# Patient Record
Sex: MALE | URBAN - METROPOLITAN AREA
[De-identification: names, ages, dates, MRNs, and addresses within clinical notes are randomized per-mention and may not be internally consistent; named-entity substitution may affect disease eponyms.]

---

## 2024-10-08 ENCOUNTER — CLAIMS CREATED FROM THE CLAIM WINDOW (OUTPATIENT)
Dept: URBAN - METROPOLITAN AREA MEDICAL CENTER 2 | Facility: MEDICAL CENTER | Age: 54
End: 2024-10-08
Payer: MEDICARE

## 2024-10-08 DIAGNOSIS — D62 ACUTE POSTHEMORRHAGIC ANEMIA: ICD-10-CM

## 2024-10-08 DIAGNOSIS — K92.1 MELENA: ICD-10-CM

## 2024-10-08 DIAGNOSIS — D69.6 ACQUIRED AMEGAKARYOCYTIC THROMBOCYTOPENIA: ICD-10-CM

## 2024-10-08 PROCEDURE — 99223 1ST HOSP IP/OBS HIGH 75: CPT | Performed by: PHYSICIAN ASSISTANT

## 2024-10-08 PROCEDURE — 99255 IP/OBS CONSLTJ NEW/EST HI 80: CPT | Performed by: PHYSICIAN ASSISTANT

## 2024-10-09 ENCOUNTER — CLAIMS CREATED FROM THE CLAIM WINDOW (OUTPATIENT)
Dept: URBAN - METROPOLITAN AREA MEDICAL CENTER 2 | Facility: MEDICAL CENTER | Age: 54
End: 2024-10-09
Payer: MEDICARE

## 2024-10-09 DIAGNOSIS — D62 ACUTE POSTHEMORRHAGIC ANEMIA: ICD-10-CM

## 2024-10-09 DIAGNOSIS — K92.1 MELENA: ICD-10-CM

## 2024-10-09 DIAGNOSIS — I86.4 GASTRIC VARICES: ICD-10-CM

## 2024-10-09 PROCEDURE — 43255 EGD CONTROL BLEEDING ANY: CPT | Performed by: STUDENT IN AN ORGANIZED HEALTH CARE EDUCATION/TRAINING PROGRAM

## 2024-10-10 ENCOUNTER — CLAIMS CREATED FROM THE CLAIM WINDOW (OUTPATIENT)
Dept: URBAN - METROPOLITAN AREA MEDICAL CENTER 2 | Facility: MEDICAL CENTER | Age: 54
End: 2024-10-10
Payer: MEDICARE

## 2024-10-10 ENCOUNTER — TELEPHONE ENCOUNTER (OUTPATIENT)
Dept: URBAN - METROPOLITAN AREA CLINIC 113 | Facility: CLINIC | Age: 54
End: 2024-10-10

## 2024-10-10 DIAGNOSIS — I86.4 GASTRIC VARICES: ICD-10-CM

## 2024-10-10 DIAGNOSIS — D62 ACUTE POSTHEMORRHAGIC ANEMIA: ICD-10-CM

## 2024-10-10 DIAGNOSIS — K92.1 ACUTE MELENA: ICD-10-CM

## 2024-10-10 PROCEDURE — 99232 SBSQ HOSP IP/OBS MODERATE 35: CPT | Performed by: PHYSICIAN ASSISTANT

## 2024-12-03 ENCOUNTER — LAB OUTSIDE AN ENCOUNTER (OUTPATIENT)
Dept: URBAN - METROPOLITAN AREA CLINIC 113 | Facility: CLINIC | Age: 54
End: 2024-12-03

## 2024-12-03 ENCOUNTER — OFFICE VISIT (OUTPATIENT)
Dept: URBAN - METROPOLITAN AREA CLINIC 113 | Facility: CLINIC | Age: 54
End: 2024-12-03
Payer: MEDICARE

## 2024-12-03 VITALS
HEART RATE: 72 BPM | BODY MASS INDEX: 37.19 KG/M2 | TEMPERATURE: 97.5 F | RESPIRATION RATE: 20 BRPM | WEIGHT: 315 LBS | HEIGHT: 77 IN | DIASTOLIC BLOOD PRESSURE: 65 MMHG | SYSTOLIC BLOOD PRESSURE: 171 MMHG

## 2024-12-03 DIAGNOSIS — Z95.828 S/P TIPS (TRANSJUGULAR INTRAHEPATIC PORTOSYSTEMIC SHUNT): ICD-10-CM

## 2024-12-03 DIAGNOSIS — K76.6 PORTAL HYPERTENSION: ICD-10-CM

## 2024-12-03 PROBLEM — 34742003: Status: ACTIVE | Noted: 2024-12-03

## 2024-12-03 PROCEDURE — 99205 OFFICE O/P NEW HI 60 MIN: CPT | Performed by: STUDENT IN AN ORGANIZED HEALTH CARE EDUCATION/TRAINING PROGRAM

## 2024-12-03 RX ORDER — LAMOTRIGINE 150 MG/1
1 TABLET TABLET ORAL ONCE A DAY
Status: ACTIVE | COMMUNITY

## 2024-12-03 RX ORDER — INSULIN GLARGINE 100 [IU]/ML
AS DIRECTED INJECTION, SOLUTION SUBCUTANEOUS
Status: ACTIVE | COMMUNITY

## 2024-12-03 RX ORDER — LACTULOSE 10 G/15ML
15 ML AS NEEDED SOLUTION ORAL THREE TIMES A DAY
Qty: 4050 ML | Refills: 1 | OUTPATIENT
Start: 2024-12-03 | End: 2025-06-01

## 2024-12-03 RX ORDER — AMLODIPINE BESYLATE 5 MG/1
1 TABLET TABLET ORAL ONCE A DAY
Status: ACTIVE | COMMUNITY

## 2024-12-03 RX ORDER — ESCITALOPRAM OXALATE 20 MG/1
1 TABLET TABLET ORAL ONCE A DAY
Status: ACTIVE | COMMUNITY

## 2024-12-03 RX ORDER — ARIPIPRAZOLE 5 MG/1
1 TABLET TABLET ORAL ONCE A DAY
Status: ACTIVE | COMMUNITY

## 2024-12-03 RX ORDER — SULFAMETHOXAZOLE AND TRIMETHOPRIM 800; 160 MG/1; MG/1
1 TABLET TABLET ORAL
Status: ACTIVE | COMMUNITY

## 2024-12-03 RX ORDER — DEXTROAMPHETAMINE SACCHARATE, AMPHETAMINE ASPARTATE, DEXTROAMPHETAMINE SULFATE, AND AMPHETAMINE SULFATE 5; 5; 5; 5 MG/1; MG/1; MG/1; MG/1
1 TABLET TABLET ORAL THREE TIMES A DAY
Refills: 0 | Status: ACTIVE | COMMUNITY

## 2024-12-03 RX ORDER — DEXTROAMPHETAMINE SULFATE, DEXTROAMPHETAMINE SACCHARATE, AMPHETAMINE SULFATE AND AMPHETAMINE ASPARTATE 7.5; 7.5; 7.5; 7.5 MG/1; MG/1; MG/1; MG/1
1 CAPSULE IN THE MORNING CAPSULE, EXTENDED RELEASE ORAL ONCE A DAY
Status: ACTIVE | COMMUNITY

## 2024-12-03 RX ORDER — ATENOLOL 25 MG/1
1 TABLET TABLET ORAL TWICE A DAY
Status: ACTIVE | COMMUNITY

## 2024-12-03 RX ORDER — BICTEGRAVIR SODIUM, EMTRICITABINE, AND TENOFOVIR ALAFENAMIDE FUMARATE 50; 200; 25 MG/1; MG/1; MG/1
1 TABLET TABLET ORAL ONCE A DAY
Status: ACTIVE | COMMUNITY

## 2024-12-03 RX ORDER — HYDRALAZINE HYDROCHLORIDE 25 MG/1
1 TABLET WITH FOOD TABLET, FILM COATED ORAL THREE TIMES A DAY
Status: ACTIVE | COMMUNITY

## 2024-12-03 RX ORDER — SEMAGLUTIDE 2.68 MG/ML
AS DIRECTED INJECTION, SOLUTION SUBCUTANEOUS
Status: ACTIVE | COMMUNITY

## 2024-12-03 NOTE — HPI-TODAY'S VISIT:
Initial visit 12/3/2024 Mr. Corrales is a 54-year-old male who I met [place] [facility] [place]. 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 He has no known history of cirrhosis but tells me that when he was 30 he was tried on an experimental HIV medication that caused liver failure c/b jaundice, he stopped the medication and his liver recovered, the details are not available for review. He also has a prior history of EtOH use but currently does not drink. He has been overweight/obese for > 10 years and was told previously that he had a fatty liver. His IGV-1 was treated by IR with coil embolization as well as a TIPS, a pre-procedural HPVG is not available however his post procedural HPVG was measured to be 7 mm Hg. He has had no further episodes of melena or overt GI bleeding. He reports worsening acid reflux which is being managed well with pepcid as needed.

## 2024-12-03 NOTE — PHYSICAL EXAM GASTROINTESTINAL
Abdomen , soft, nontender, nondistended , no guarding or rigidity , no masses palpable , normal bowel sounds , +ve central obesity. Liver and Spleen , no hepatomegaly present , no hepatosplenomegaly , liver nontender , spleen not palpable

## 2024-12-10 ENCOUNTER — TELEPHONE ENCOUNTER (OUTPATIENT)
Dept: URBAN - METROPOLITAN AREA CLINIC 113 | Facility: CLINIC | Age: 54
End: 2024-12-10

## 2024-12-10 ENCOUNTER — LAB OUTSIDE AN ENCOUNTER (OUTPATIENT)
Dept: URBAN - METROPOLITAN AREA CLINIC 113 | Facility: CLINIC | Age: 54
End: 2024-12-10

## 2024-12-10 LAB
A/G RATIO: 1.2
ABSOLUTE BASOPHILS: 29
ABSOLUTE EOSINOPHILS: 29
ABSOLUTE LYMPHOCYTES: 630
ABSOLUTE MONOCYTES: 230
ABSOLUTE NEUTROPHILS: 2682
AFP, SERUM, TUMOR MARKER: 3
ALBUMIN: 3.8
ALKALINE PHOSPHATASE: 108
ALT (SGPT): 20
AST (SGOT): 25
BASOPHILS: 0.8
BILIRUBIN, TOTAL: 0.9
BUN/CREATININE RATIO: (no result)
BUN: 14
CALCIUM: 8.8
CARBON DIOXIDE, TOTAL: 27
CERULOPLASMIN: 28
CHLORIDE: 103
CREATININE: 0.97
EGFR: 93
EOSINOPHILS: 0.8
FERRITIN, SERUM: 19
GLOBULIN, TOTAL: 3.3
GLUCOSE: 224
HEMATOCRIT: 35.6
HEMOGLOBIN: 10.3
HEP A AB, IGM: (no result)
HEP B CORE AB, IGM: (no result)
HEPATITIS A AB, TOTAL: REACTIVE
HEPATITIS B CORE AB TOTAL: REACTIVE
HEPATITIS B SURFACE ANTIBODY QL: (no result)
HEPATITIS B SURFACE ANTIGEN: (no result)
HEPATITIS C ANTIBODY: (no result)
IMMUNOGLOBULIN G, QN, SERUM: 1703
INR: 1.1
IRON BIND.CAP.(TIBC): 414
IRON SATURATION: 6
IRON: 26
LYMPHOCYTES: 17.5
MCH: 21.2
MCHC: 28.9
MCV: 73.4
MONOCYTES: 6.4
MPV: (no result)
NEUTROPHILS: 74.5
PLATELET COUNT: 105
POTASSIUM: 3.3
PROTEIN, TOTAL: 7.1
PT: 11.2
RDW: 15.3
RED BLOOD CELL COUNT: 4.85
SODIUM: 138
WHITE BLOOD CELL COUNT: 3.6

## 2024-12-11 ENCOUNTER — TELEPHONE ENCOUNTER (OUTPATIENT)
Dept: URBAN - METROPOLITAN AREA CLINIC 113 | Facility: CLINIC | Age: 54
End: 2024-12-11

## 2024-12-16 ENCOUNTER — OFFICE VISIT (OUTPATIENT)
Dept: URBAN - METROPOLITAN AREA MEDICAL CENTER 19 | Facility: MEDICAL CENTER | Age: 54
End: 2024-12-16

## 2024-12-21 ENCOUNTER — WEB ENCOUNTER (OUTPATIENT)
Dept: URBAN - METROPOLITAN AREA CLINIC 113 | Facility: CLINIC | Age: 54
End: 2024-12-21

## 2024-12-23 ENCOUNTER — DASHBOARD ENCOUNTERS (OUTPATIENT)
Age: 54
End: 2024-12-23

## 2024-12-23 ENCOUNTER — OFFICE VISIT (OUTPATIENT)
Dept: URBAN - METROPOLITAN AREA MEDICAL CENTER 19 | Facility: MEDICAL CENTER | Age: 54
End: 2024-12-23

## 2024-12-23 DIAGNOSIS — D50.0 IRON DEFICIENCY ANEMIA DUE TO CHRONIC BLOOD LOSS: ICD-10-CM

## 2024-12-23 DIAGNOSIS — K29.50 CHRONIC GASTRITIS WITHOUT BLEEDING, UNSPECIFIED GASTRITIS TYPE: ICD-10-CM

## 2024-12-23 PROBLEM — 724556004: Status: ACTIVE | Noted: 2024-12-23

## 2024-12-23 PROBLEM — 8493009: Status: ACTIVE | Noted: 2024-12-23

## 2024-12-23 RX ORDER — FERROUS SULFATE TAB EC 325 MG (65 MG FE EQUIVALENT) 325 (65 FE) MG
1 TABLET TABLET DELAYED RESPONSE ORAL
Qty: 36 | Refills: 0 | OUTPATIENT
Start: 2024-12-23

## 2024-12-23 RX ORDER — OMEPRAZOLE 40 MG/1
1 CAPSULE 30 MINUTES BEFORE MORNING AND EVENING MEAL CAPSULE, DELAYED RELEASE ORAL TWICE A DAY
Qty: 180 | Refills: 0 | OUTPATIENT
Start: 2024-12-23

## 2024-12-23 RX ORDER — LACTULOSE 10 G/15ML
15 ML AS NEEDED SOLUTION ORAL THREE TIMES A DAY
Qty: 4050 ML | Refills: 1 | Status: ACTIVE | COMMUNITY
Start: 2024-12-03 | End: 2025-06-01

## 2024-12-23 RX ORDER — ESCITALOPRAM OXALATE 20 MG/1
1 TABLET TABLET ORAL ONCE A DAY
Status: ACTIVE | COMMUNITY

## 2024-12-23 RX ORDER — AMLODIPINE BESYLATE 5 MG/1
1 TABLET TABLET ORAL ONCE A DAY
Status: ACTIVE | COMMUNITY

## 2024-12-23 RX ORDER — ATENOLOL 25 MG/1
1 TABLET TABLET ORAL TWICE A DAY
Status: ACTIVE | COMMUNITY

## 2024-12-23 RX ORDER — BICTEGRAVIR SODIUM, EMTRICITABINE, AND TENOFOVIR ALAFENAMIDE FUMARATE 50; 200; 25 MG/1; MG/1; MG/1
1 TABLET TABLET ORAL ONCE A DAY
Status: ACTIVE | COMMUNITY

## 2024-12-23 RX ORDER — LAMOTRIGINE 150 MG/1
1 TABLET TABLET ORAL ONCE A DAY
Status: ACTIVE | COMMUNITY

## 2024-12-23 RX ORDER — ARIPIPRAZOLE 5 MG/1
1 TABLET TABLET ORAL ONCE A DAY
Status: ACTIVE | COMMUNITY

## 2024-12-23 RX ORDER — SEMAGLUTIDE 2.68 MG/ML
AS DIRECTED INJECTION, SOLUTION SUBCUTANEOUS
Status: ACTIVE | COMMUNITY

## 2024-12-23 RX ORDER — DEXTROAMPHETAMINE SACCHARATE, AMPHETAMINE ASPARTATE, DEXTROAMPHETAMINE SULFATE, AND AMPHETAMINE SULFATE 5; 5; 5; 5 MG/1; MG/1; MG/1; MG/1
1 TABLET TABLET ORAL THREE TIMES A DAY
Refills: 0 | Status: ACTIVE | COMMUNITY

## 2024-12-23 RX ORDER — SULFAMETHOXAZOLE AND TRIMETHOPRIM 800; 160 MG/1; MG/1
1 TABLET TABLET ORAL
Status: ACTIVE | COMMUNITY

## 2024-12-23 RX ORDER — HYDRALAZINE HYDROCHLORIDE 25 MG/1
1 TABLET WITH FOOD TABLET, FILM COATED ORAL THREE TIMES A DAY
Status: ACTIVE | COMMUNITY

## 2024-12-23 RX ORDER — DEXTROAMPHETAMINE SULFATE, DEXTROAMPHETAMINE SACCHARATE, AMPHETAMINE SULFATE AND AMPHETAMINE ASPARTATE 7.5; 7.5; 7.5; 7.5 MG/1; MG/1; MG/1; MG/1
1 CAPSULE IN THE MORNING CAPSULE, EXTENDED RELEASE ORAL ONCE A DAY
Status: ACTIVE | COMMUNITY

## 2024-12-23 RX ORDER — INSULIN GLARGINE 100 [IU]/ML
AS DIRECTED INJECTION, SOLUTION SUBCUTANEOUS
Status: ACTIVE | COMMUNITY

## 2024-12-26 ENCOUNTER — WEB ENCOUNTER (OUTPATIENT)
Dept: URBAN - METROPOLITAN AREA CLINIC 113 | Facility: CLINIC | Age: 54
End: 2024-12-26

## 2024-12-26 ENCOUNTER — TELEPHONE ENCOUNTER (OUTPATIENT)
Dept: URBAN - METROPOLITAN AREA CLINIC 113 | Facility: CLINIC | Age: 54
End: 2024-12-26

## 2024-12-26 RX ORDER — OMEPRAZOLE 40 MG/1
1 CAPSULE 30 MINUTES BEFORE MORNING AND EVENING MEAL CAPSULE, DELAYED RELEASE ORAL TWICE A DAY
Qty: 180 | Refills: 1 | OUTPATIENT
Start: 2024-12-23

## 2024-12-26 RX ORDER — FERROUS SULFATE TAB EC 325 MG (65 MG FE EQUIVALENT) 325 (65 FE) MG
1 TABLET TABLET DELAYED RESPONSE ORAL
Qty: 36 | Refills: 1 | OUTPATIENT
Start: 2024-12-23

## 2025-01-15 ENCOUNTER — OFFICE VISIT (OUTPATIENT)
Dept: URBAN - METROPOLITAN AREA CLINIC 113 | Facility: CLINIC | Age: 55
End: 2025-01-15
Payer: MEDICARE

## 2025-01-15 VITALS
DIASTOLIC BLOOD PRESSURE: 65 MMHG | WEIGHT: 315 LBS | HEIGHT: 77 IN | BODY MASS INDEX: 37.19 KG/M2 | SYSTOLIC BLOOD PRESSURE: 148 MMHG | HEART RATE: 75 BPM | TEMPERATURE: 98 F

## 2025-01-15 DIAGNOSIS — K29.50 CHRONIC GASTRITIS WITHOUT BLEEDING, UNSPECIFIED GASTRITIS TYPE: ICD-10-CM

## 2025-01-15 DIAGNOSIS — Z95.828 S/P TIPS (TRANSJUGULAR INTRAHEPATIC PORTOSYSTEMIC SHUNT): ICD-10-CM

## 2025-01-15 DIAGNOSIS — K76.6 PORTAL HYPERTENSION: ICD-10-CM

## 2025-01-15 DIAGNOSIS — K74.69 OTHER CIRRHOSIS OF LIVER: ICD-10-CM

## 2025-01-15 DIAGNOSIS — D50.0 IRON DEFICIENCY ANEMIA DUE TO CHRONIC BLOOD LOSS: ICD-10-CM

## 2025-01-15 PROBLEM — 19943007: Status: ACTIVE | Noted: 2025-01-15

## 2025-01-15 PROCEDURE — 99214 OFFICE O/P EST MOD 30 MIN: CPT | Performed by: STUDENT IN AN ORGANIZED HEALTH CARE EDUCATION/TRAINING PROGRAM

## 2025-01-15 PROCEDURE — 99213 OFFICE O/P EST LOW 20 MIN: CPT | Performed by: STUDENT IN AN ORGANIZED HEALTH CARE EDUCATION/TRAINING PROGRAM

## 2025-01-15 RX ORDER — OMEPRAZOLE 40 MG/1
1 CAPSULE 30 MINUTES BEFORE MORNING AND EVENING MEAL CAPSULE, DELAYED RELEASE ORAL TWICE A DAY
Qty: 180 | Refills: 1 | Status: ACTIVE | COMMUNITY
Start: 2024-12-23

## 2025-01-15 RX ORDER — LAMOTRIGINE 150 MG/1
1 TABLET TABLET ORAL ONCE A DAY
Status: ACTIVE | COMMUNITY

## 2025-01-15 RX ORDER — ESCITALOPRAM OXALATE 20 MG/1
1 TABLET TABLET ORAL ONCE A DAY
Status: ACTIVE | COMMUNITY

## 2025-01-15 RX ORDER — ARIPIPRAZOLE 5 MG/1
1 TABLET TABLET ORAL ONCE A DAY
Status: ACTIVE | COMMUNITY

## 2025-01-15 RX ORDER — SEMAGLUTIDE 2.68 MG/ML
AS DIRECTED INJECTION, SOLUTION SUBCUTANEOUS
Status: ACTIVE | COMMUNITY

## 2025-01-15 RX ORDER — HYDRALAZINE HYDROCHLORIDE 25 MG/1
1 TABLET WITH FOOD TABLET, FILM COATED ORAL THREE TIMES A DAY
Status: ACTIVE | COMMUNITY

## 2025-01-15 RX ORDER — AMLODIPINE BESYLATE 5 MG/1
1 TABLET TABLET ORAL ONCE A DAY
Status: ACTIVE | COMMUNITY

## 2025-01-15 RX ORDER — FERROUS SULFATE TAB EC 325 MG (65 MG FE EQUIVALENT) 325 (65 FE) MG
1 TABLET TABLET DELAYED RESPONSE ORAL
Qty: 36 | Refills: 1 | Status: ACTIVE | COMMUNITY
Start: 2024-12-23

## 2025-01-15 RX ORDER — DEXTROAMPHETAMINE SACCHARATE, AMPHETAMINE ASPARTATE, DEXTROAMPHETAMINE SULFATE, AND AMPHETAMINE SULFATE 5; 5; 5; 5 MG/1; MG/1; MG/1; MG/1
1 TABLET TABLET ORAL THREE TIMES A DAY
Refills: 0 | Status: ACTIVE | COMMUNITY

## 2025-01-15 RX ORDER — POTASSIUM CHLORIDE 1500 MG/1
1 TABLET WITH FOOD TABLET, EXTENDED RELEASE ORAL ONCE A DAY
Status: ACTIVE | COMMUNITY

## 2025-01-15 RX ORDER — DEXTROAMPHETAMINE SULFATE, DEXTROAMPHETAMINE SACCHARATE, AMPHETAMINE SULFATE AND AMPHETAMINE ASPARTATE 7.5; 7.5; 7.5; 7.5 MG/1; MG/1; MG/1; MG/1
1 CAPSULE IN THE MORNING CAPSULE, EXTENDED RELEASE ORAL ONCE A DAY
Status: ACTIVE | COMMUNITY

## 2025-01-15 RX ORDER — BICTEGRAVIR SODIUM, EMTRICITABINE, AND TENOFOVIR ALAFENAMIDE FUMARATE 50; 200; 25 MG/1; MG/1; MG/1
1 TABLET TABLET ORAL ONCE A DAY
Status: ACTIVE | COMMUNITY

## 2025-01-15 RX ORDER — VALSARTAN 160 MG/1
1 TABLET TABLET, FILM COATED ORAL ONCE A DAY
Status: ACTIVE | COMMUNITY

## 2025-01-15 RX ORDER — METFORMIN HYDROCHLORIDE 500 MG/1
1 TABLET WITH A MEAL TABLET, FILM COATED ORAL ONCE A DAY
Status: ACTIVE | COMMUNITY

## 2025-01-15 RX ORDER — SULFAMETHOXAZOLE AND TRIMETHOPRIM 800; 160 MG/1; MG/1
1 TABLET TABLET ORAL
Status: ACTIVE | COMMUNITY

## 2025-01-15 RX ORDER — LACTULOSE 10 G/15ML
15 ML AS NEEDED SOLUTION ORAL THREE TIMES A DAY
Qty: 4050 ML | Refills: 1 | Status: ACTIVE | COMMUNITY
Start: 2024-12-03 | End: 2025-06-01

## 2025-01-15 RX ORDER — INSULIN GLARGINE 100 [IU]/ML
AS DIRECTED INJECTION, SOLUTION SUBCUTANEOUS
Status: ACTIVE | COMMUNITY

## 2025-01-15 RX ORDER — ATENOLOL 25 MG/1
1 TABLET TABLET ORAL TWICE A DAY
Status: ACTIVE | COMMUNITY

## 2025-01-15 NOTE — HPI-TODAY'S VISIT:
Initial visit 12/3/2024 Mr. Corrales is a 54-year-old male who I [place] [facility] [place]. 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 He has no known history of cirrhosis but tells me that when he was 30 he was tried on an experimental HIV medication that caused liver failure c/b jaundice, he stopped the medication and his liver recovered, the details are not available for review. He also has a prior history of EtOH use but currently does not drink. He has been overweight/obese for > 10 years and was told previously that he had a fatty liver. His IGV-1 was treated by IR with coil embolization as well as a TIPS, a pre-procedural HPVG is not available however his post procedural HPVG was measured to be 7 mm Hg. He has had no further episodes of melena or overt GI bleeding. He reports worsening acid reflux which is being managed well with pepcid as needed. . Follow-up visit 1/15/2025 Ultrasound elastography 1/9/2025: Liver appeared normal, median liver stiffness 15.69 kPa consistent with cirrhosis, patent hepatic vasculature with patent TIPS in place and mild elevated velocities along the distal end of the TIPS, evidence of splenomegaly. EGD 12/23/2024: Esophagus normal, Z-line regular at 40 cm, hemoglobin found in the stomach, type I isolated gastric varices previously treated, gastritis noted, normal-appearing duodenum.  Gastric biopsies revealed mild chronic gastritis. Labs 12/9/2024: WBC 3.6, hemoglobin 10.3, platelet 105, BUN 14, creatinine 0.97, sodium 138, potassium 3.3, total bilirubin 0.9, Clin phosphate 108, AST 25, ALT 20, INR 1.1, hepatitis B core total antibody reactive, hepatitis B surface antigen nonreactive, hepatitis B surface antibody nonreactive, hepatitis B core IgM antibody nonreactive [consistent with prior hepatitis B infection with loss of hepatitis B surface antibody], hepatitis C antibody nonreactive.  Ferritin 19, iron saturation 6%, ceruloplasmin normal, immunoglobulin 1703 [upper limit normal 1640], AFP normal. Labs 10/25/2024: BUN 19, creatinine 0.98, sodium 142, potassium 3.5, albumin 3.6, total bilirubin 0.4, alkaline phosphatase 130, AST 25, ALT 24, WBC 3.2, hemoglobin 9.5, platelet 99, A1c 8.2%. After his EGD 12/23/24 he was feeling well. Approximately 3 days after he developed worsening shortness of breath and went to [facility]. He had a CXR that was unrevealing however a CT chest noted bilateral pleural effusions, moderate in size. He was started on lasix with improvement in his shortness of breath. He had an ECHO that revealed an EF of 60%. He feels well today, his shortness of breath has significantly improved on lasix 40mg daily. He was previously sleeping throughout the day, this has improved. He has been dealing with constipation, currently managing with lactulose that was given to him after his last visit.

## 2025-03-11 ENCOUNTER — OFFICE VISIT (OUTPATIENT)
Dept: URBAN - METROPOLITAN AREA CLINIC 113 | Facility: CLINIC | Age: 55
End: 2025-03-11
Payer: MEDICARE

## 2025-03-11 VITALS
HEART RATE: 80 BPM | DIASTOLIC BLOOD PRESSURE: 75 MMHG | HEIGHT: 77 IN | WEIGHT: 315 LBS | BODY MASS INDEX: 37.19 KG/M2 | TEMPERATURE: 99 F | RESPIRATION RATE: 20 BRPM | SYSTOLIC BLOOD PRESSURE: 191 MMHG

## 2025-03-11 DIAGNOSIS — K29.50 CHRONIC GASTRITIS WITHOUT BLEEDING, UNSPECIFIED GASTRITIS TYPE: ICD-10-CM

## 2025-03-11 DIAGNOSIS — Z95.828 S/P TIPS (TRANSJUGULAR INTRAHEPATIC PORTOSYSTEMIC SHUNT): ICD-10-CM

## 2025-03-11 DIAGNOSIS — D50.0 IRON DEFICIENCY ANEMIA DUE TO CHRONIC BLOOD LOSS: ICD-10-CM

## 2025-03-11 DIAGNOSIS — K74.69 OTHER CIRRHOSIS OF LIVER: ICD-10-CM

## 2025-03-11 DIAGNOSIS — K76.6 PORTAL HYPERTENSION: ICD-10-CM

## 2025-03-11 PROCEDURE — 99214 OFFICE O/P EST MOD 30 MIN: CPT | Performed by: STUDENT IN AN ORGANIZED HEALTH CARE EDUCATION/TRAINING PROGRAM

## 2025-03-11 RX ORDER — ARIPIPRAZOLE 5 MG/1
1 TABLET TABLET ORAL ONCE A DAY
Status: ACTIVE | COMMUNITY

## 2025-03-11 RX ORDER — FUROSEMIDE 40 MG/1
1 TABLET TABLET ORAL ONCE A DAY
Qty: 90 TABLET | Refills: 0 | OUTPATIENT
Start: 2025-03-11 | End: 2025-06-09

## 2025-03-11 RX ORDER — SEMAGLUTIDE 2.68 MG/ML
AS DIRECTED INJECTION, SOLUTION SUBCUTANEOUS
Status: ACTIVE | COMMUNITY

## 2025-03-11 RX ORDER — INSULIN GLARGINE 100 [IU]/ML
AS DIRECTED INJECTION, SOLUTION SUBCUTANEOUS
Status: ACTIVE | COMMUNITY

## 2025-03-11 RX ORDER — ATENOLOL 25 MG/1
1 TABLET TABLET ORAL TWICE A DAY
Status: ACTIVE | COMMUNITY

## 2025-03-11 RX ORDER — SULFAMETHOXAZOLE AND TRIMETHOPRIM 800; 160 MG/1; MG/1
1 TABLET TABLET ORAL
Status: ACTIVE | COMMUNITY

## 2025-03-11 RX ORDER — LACTULOSE 10 G/15ML
15 ML AS NEEDED SOLUTION ORAL THREE TIMES A DAY
Qty: 4050 ML | Refills: 1 | Status: ACTIVE | COMMUNITY
Start: 2024-12-03 | End: 2025-06-01

## 2025-03-11 RX ORDER — OMEPRAZOLE 40 MG/1
1 CAPSULE 30 MINUTES BEFORE MORNING AND EVENING MEAL CAPSULE, DELAYED RELEASE ORAL TWICE A DAY
Qty: 180 | Refills: 1 | Status: ACTIVE | COMMUNITY
Start: 2024-12-23

## 2025-03-11 RX ORDER — LAMOTRIGINE 150 MG/1
1 TABLET TABLET ORAL ONCE A DAY
Status: ACTIVE | COMMUNITY

## 2025-03-11 RX ORDER — DEXTROAMPHETAMINE SACCHARATE, AMPHETAMINE ASPARTATE, DEXTROAMPHETAMINE SULFATE, AND AMPHETAMINE SULFATE 5; 5; 5; 5 MG/1; MG/1; MG/1; MG/1
1 TABLET TABLET ORAL THREE TIMES A DAY
Refills: 0 | Status: ACTIVE | COMMUNITY

## 2025-03-11 RX ORDER — ESCITALOPRAM OXALATE 20 MG/1
1 TABLET TABLET ORAL ONCE A DAY
Status: ACTIVE | COMMUNITY

## 2025-03-11 RX ORDER — FERROUS SULFATE TAB EC 325 MG (65 MG FE EQUIVALENT) 325 (65 FE) MG
1 TABLET TABLET DELAYED RESPONSE ORAL
Qty: 36 | Refills: 1 | Status: ACTIVE | COMMUNITY
Start: 2024-12-23

## 2025-03-11 RX ORDER — HYDRALAZINE HYDROCHLORIDE 25 MG/1
1 TABLET WITH FOOD TABLET, FILM COATED ORAL THREE TIMES A DAY
Status: ACTIVE | COMMUNITY

## 2025-03-11 RX ORDER — AMLODIPINE BESYLATE 5 MG/1
1 TABLET TABLET ORAL ONCE A DAY
Status: ACTIVE | COMMUNITY

## 2025-03-11 RX ORDER — POTASSIUM CHLORIDE 1500 MG/1
1 TABLET WITH FOOD TABLET, EXTENDED RELEASE ORAL ONCE A DAY
Status: ACTIVE | COMMUNITY

## 2025-03-11 RX ORDER — VALSARTAN 160 MG/1
1 TABLET TABLET, FILM COATED ORAL ONCE A DAY
Status: ACTIVE | COMMUNITY

## 2025-03-11 RX ORDER — DEXTROAMPHETAMINE SULFATE, DEXTROAMPHETAMINE SACCHARATE, AMPHETAMINE SULFATE AND AMPHETAMINE ASPARTATE 7.5; 7.5; 7.5; 7.5 MG/1; MG/1; MG/1; MG/1
1 CAPSULE IN THE MORNING CAPSULE, EXTENDED RELEASE ORAL ONCE A DAY
Status: ACTIVE | COMMUNITY

## 2025-03-11 RX ORDER — BICTEGRAVIR SODIUM, EMTRICITABINE, AND TENOFOVIR ALAFENAMIDE FUMARATE 50; 200; 25 MG/1; MG/1; MG/1
1 TABLET TABLET ORAL ONCE A DAY
Status: ACTIVE | COMMUNITY

## 2025-03-11 RX ORDER — METFORMIN HYDROCHLORIDE 500 MG/1
1 TABLET WITH A MEAL TABLET, FILM COATED ORAL ONCE A DAY
Status: ACTIVE | COMMUNITY

## 2025-03-11 NOTE — PHYSICAL EXAM CHEST:
no lesions, no deformities, no traumatic injuries, no significant scars are present, chest wall non-tender, no masses present, breathing is unlabored without accessory muscle use,normal breath sounds
independent

## 2025-03-11 NOTE — HPI-TODAY'S VISIT:
Initial visit 12/3/2024 Mr. Corrales is a 54-year-old male who I met in consultation at [facility] [place] when he was hospitalized for melena. 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 He has no known history of cirrhosis but tells me that when he was 30 he was tried on an experimental HIV medication that caused liver failure c/b jaundice, he stopped the medication and his liver recovered, the details are not available for review. He also has a prior history of EtOH use but currently does not drink. He has been overweight/obese for > 10 years and was told previously that he had a fatty liver. His IGV-1 was treated by IR with coil embolization as well as a TIPS, a pre-procedural HPVG is not available however his post procedural HPVG was measured to be 7 mm Hg. He has had no further episodes of melena or overt GI bleeding. He reports worsening acid reflux which is being managed well with pepcid as needed. . Follow-up visit 1/15/2025 Ultrasound elastography 1/9/2025: Liver appeared normal, median liver stiffness 15.69 kPa consistent with cirrhosis, patent hepatic vasculature with patent TIPS in place and mild elevated velocities along the distal end of the TIPS, evidence of splenomegaly. EGD 12/23/2024: Esophagus normal, Z-line regular at 40 cm, hemoglobin found in the stomach, type I isolated gastric varices previously treated, gastritis noted, normal-appearing duodenum.  Gastric biopsies revealed mild chronic gastritis. Labs 12/9/2024: WBC 3.6, hemoglobin 10.3, platelet 105, BUN 14, creatinine 0.97, sodium 138, potassium 3.3, total bilirubin 0.9, Clin phosphate 108, AST 25, ALT 20, INR 1.1, hepatitis B core total antibody reactive, hepatitis B surface antigen nonreactive, hepatitis B surface antibody nonreactive, hepatitis B core IgM antibody nonreactive [consistent with prior hepatitis B infection with loss of hepatitis B surface antibody], hepatitis C antibody nonreactive.  Ferritin 19, iron saturation 6%, ceruloplasmin normal, immunoglobulin 1703 [upper limit normal 1640], AFP normal. Labs 10/25/2024: BUN 19, creatinine 0.98, sodium 142, potassium 3.5, albumin 3.6, total bilirubin 0.4, alkaline phosphatase 130, AST 25, ALT 24, WBC 3.2, hemoglobin 9.5, platelet 99, A1c 8.2%. After his EGD 12/23/24 he was feeling well. Approximately 3 days after he developed worsening shortness of breath and went to [facility]. He had a CXR that was unrevealing however a CT chest noted bilateral pleural effusions, moderate in size. He was started on lasix with improvement in his shortness of breath. He had an ECHO that revealed an EF of 60%. He feels well today, his shortness of breath has significantly improved on lasix 40mg daily. He was previously sleeping throughout the day, this has improved. He has been dealing with constipation, currently managing with lactulose that was given to him after his last visit. . Follow up visit 3/11/25 His last MELD-Na was 7. He reports itching over his upper extremities associated with a vesicular rash. He denies confusion but does have occasional lethargy. He is having good bowel movements. He has been using various herbal products including milk thistle, dandelion root, ashwaghanda, and tumeric. He continues to have lower extremity edema. No GI bleeding.

## 2025-03-17 ENCOUNTER — TELEPHONE ENCOUNTER (OUTPATIENT)
Dept: URBAN - METROPOLITAN AREA CLINIC 113 | Facility: CLINIC | Age: 55
End: 2025-03-17

## 2025-03-17 PROBLEM — 87522002: Status: ACTIVE | Noted: 2025-03-17

## 2025-03-17 LAB
% SATURATION: 9
ABSOLUTE BASOPHILS: 22
ABSOLUTE EOSINOPHILS: 31
ABSOLUTE LYMPHOCYTES: 837
ABSOLUTE MONOCYTES: 242
ABSOLUTE NEUTROPHILS: 1969
ANA SCREEN, IFA: NEGATIVE
BASOPHILS: 0.7
COMMENT(S): (no result)
EOSINOPHILS: 1
FERRITIN: 12
HEMATOCRIT: 32.9
HEMOGLOBIN: 9.6
IRON BINDING CAPACITY: 376
IRON, TOTAL: 32
LYMPHOCYTES: 27
MCH: 20.9
MCHC: 29.2
MCV: 71.7
MITOCHONDRIAL (M2) ANTIBODY: <=20
MONOCYTES: 7.8
MPV: (no result)
NEUTROPHILS: 63.5
PLATELET COUNT: 89
RDW: 18.5
RED BLOOD CELL COUNT: 4.59
SMOOTH MUSCLE AB SCREEN: NEGATIVE
WHITE BLOOD CELL COUNT: 3.1

## 2025-03-20 ENCOUNTER — WEB ENCOUNTER (OUTPATIENT)
Dept: URBAN - METROPOLITAN AREA CLINIC 113 | Facility: CLINIC | Age: 55
End: 2025-03-20

## 2025-03-26 ENCOUNTER — TELEPHONE ENCOUNTER (OUTPATIENT)
Dept: URBAN - METROPOLITAN AREA CLINIC 113 | Facility: CLINIC | Age: 55
End: 2025-03-26

## 2025-04-01 ENCOUNTER — TELEPHONE ENCOUNTER (OUTPATIENT)
Dept: URBAN - METROPOLITAN AREA CLINIC 113 | Facility: CLINIC | Age: 55
End: 2025-04-01

## 2025-04-21 ENCOUNTER — OFFICE VISIT (OUTPATIENT)
Dept: URBAN - METROPOLITAN AREA CLINIC 113 | Facility: CLINIC | Age: 55
End: 2025-04-21

## 2025-04-30 ENCOUNTER — OFFICE VISIT (OUTPATIENT)
Dept: URBAN - METROPOLITAN AREA CLINIC 113 | Facility: CLINIC | Age: 55
End: 2025-04-30

## 2025-04-30 ENCOUNTER — LAB OUTSIDE AN ENCOUNTER (OUTPATIENT)
Dept: URBAN - METROPOLITAN AREA CLINIC 113 | Facility: CLINIC | Age: 55
End: 2025-04-30

## 2025-04-30 PROBLEM — 127034005: Status: ACTIVE | Noted: 2025-04-30

## 2025-04-30 RX ORDER — VALSARTAN 160 MG/1
1 TABLET TABLET, FILM COATED ORAL ONCE A DAY
Status: ACTIVE | COMMUNITY

## 2025-04-30 RX ORDER — EPLERENONE 25 MG/1
1 TABLET TABLET ORAL ONCE A DAY
Status: ACTIVE | COMMUNITY

## 2025-04-30 RX ORDER — HYDRALAZINE HYDROCHLORIDE 25 MG/1
1 TABLET WITH FOOD TABLET, FILM COATED ORAL THREE TIMES A DAY
Status: ACTIVE | COMMUNITY

## 2025-04-30 RX ORDER — ATENOLOL 25 MG/1
1 TABLET TABLET ORAL TWICE A DAY
Status: ACTIVE | COMMUNITY

## 2025-04-30 RX ORDER — BICTEGRAVIR SODIUM, EMTRICITABINE, AND TENOFOVIR ALAFENAMIDE FUMARATE 50; 200; 25 MG/1; MG/1; MG/1
1 TABLET TABLET ORAL ONCE A DAY
Status: ACTIVE | COMMUNITY

## 2025-04-30 RX ORDER — POTASSIUM CHLORIDE 1500 MG/1
1 TABLET WITH FOOD TABLET, EXTENDED RELEASE ORAL ONCE A DAY
Status: ACTIVE | COMMUNITY

## 2025-04-30 RX ORDER — OMEPRAZOLE 40 MG/1
1 CAPSULE 30 MINUTES BEFORE MORNING AND EVENING MEAL CAPSULE, DELAYED RELEASE ORAL TWICE A DAY
Qty: 180 | Refills: 1 | Status: ACTIVE | COMMUNITY
Start: 2024-12-23

## 2025-04-30 RX ORDER — AMLODIPINE BESYLATE 5 MG/1
1 TABLET TABLET ORAL ONCE A DAY
Status: ACTIVE | COMMUNITY

## 2025-04-30 RX ORDER — BUMETANIDE 1 MG/1
1 TABLET TABLET ORAL ONCE A DAY
Status: ACTIVE | COMMUNITY

## 2025-04-30 RX ORDER — LACTULOSE 10 G/15ML
15 ML AS NEEDED SOLUTION ORAL THREE TIMES A DAY
Qty: 4050 ML | Refills: 1 | Status: ACTIVE | COMMUNITY
Start: 2024-12-03 | End: 2025-06-01

## 2025-04-30 RX ORDER — SULFAMETHOXAZOLE AND TRIMETHOPRIM 800; 160 MG/1; MG/1
1 TABLET TABLET ORAL
Status: ACTIVE | COMMUNITY

## 2025-04-30 RX ORDER — SEMAGLUTIDE 2.68 MG/ML
AS DIRECTED INJECTION, SOLUTION SUBCUTANEOUS
Status: ACTIVE | COMMUNITY

## 2025-04-30 RX ORDER — DEXTROAMPHETAMINE SULFATE, DEXTROAMPHETAMINE SACCHARATE, AMPHETAMINE SULFATE AND AMPHETAMINE ASPARTATE 7.5; 7.5; 7.5; 7.5 MG/1; MG/1; MG/1; MG/1
1 CAPSULE IN THE MORNING CAPSULE, EXTENDED RELEASE ORAL ONCE A DAY
Status: ACTIVE | COMMUNITY

## 2025-04-30 RX ORDER — INSULIN GLARGINE 100 [IU]/ML
AS DIRECTED INJECTION, SOLUTION SUBCUTANEOUS
Status: ACTIVE | COMMUNITY

## 2025-04-30 RX ORDER — ESCITALOPRAM OXALATE 20 MG/1
1 TABLET TABLET ORAL ONCE A DAY
Status: ACTIVE | COMMUNITY

## 2025-04-30 RX ORDER — FUROSEMIDE 40 MG/1
1 TABLET TABLET ORAL ONCE A DAY
Qty: 90 TABLET | Refills: 0 | OUTPATIENT
Start: 2025-04-30 | End: 2025-07-29

## 2025-04-30 RX ORDER — FUROSEMIDE 40 MG/1
1 TABLET TABLET ORAL ONCE A DAY
Qty: 90 TABLET | Refills: 0 | Status: ACTIVE | COMMUNITY
Start: 2025-03-11 | End: 2025-06-09

## 2025-04-30 RX ORDER — ARIPIPRAZOLE 5 MG/1
1 TABLET TABLET ORAL ONCE A DAY
Status: ACTIVE | COMMUNITY

## 2025-04-30 RX ORDER — METFORMIN HYDROCHLORIDE 500 MG/1
1 TABLET WITH A MEAL TABLET, FILM COATED ORAL ONCE A DAY
Status: ACTIVE | COMMUNITY

## 2025-04-30 RX ORDER — LAMOTRIGINE 150 MG/1
1 TABLET TABLET ORAL ONCE A DAY
Status: ACTIVE | COMMUNITY

## 2025-04-30 RX ORDER — FERROUS SULFATE TAB EC 325 MG (65 MG FE EQUIVALENT) 325 (65 FE) MG
1 TABLET TABLET DELAYED RESPONSE ORAL
Qty: 36 | Refills: 1 | Status: ACTIVE | COMMUNITY
Start: 2024-12-23

## 2025-04-30 RX ORDER — DEXTROAMPHETAMINE SACCHARATE, AMPHETAMINE ASPARTATE, DEXTROAMPHETAMINE SULFATE, AND AMPHETAMINE SULFATE 5; 5; 5; 5 MG/1; MG/1; MG/1; MG/1
1 TABLET TABLET ORAL THREE TIMES A DAY
Refills: 0 | Status: ACTIVE | COMMUNITY

## 2025-04-30 NOTE — HPI-TODAY'S VISIT:
Initial visit 12/3/2024 Mr. Corrales is a 54-year-old male who I met in consultation at [facility] [place] when he was hospitalized for melena. 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 He has no known history of cirrhosis but tells me that when he was 30 he was tried on an experimental HIV medication that caused liver failure c/b jaundice, he stopped the medication and his liver recovered, the details are not available for review. He also has a prior history of EtOH use but currently does not drink. He has been overweight/obese for > 10 years and was told previously that he had a fatty liver. His IGV-1 was treated by IR with coil embolization as well as a TIPS, a pre-procedural HPVG is not available however his post procedural HPVG was measured to be 7 mm Hg. He has had no further episodes of melena or overt GI bleeding. He reports worsening acid reflux which is being managed well with pepcid as needed. . Follow-up visit 1/15/2025 Ultrasound elastography 1/9/2025: Liver appeared normal, median liver stiffness 15.69 kPa consistent with cirrhosis, patent hepatic vasculature with patent TIPS in place and mild elevated velocities along the distal end of the TIPS, evidence of splenomegaly. EGD 12/23/2024: Esophagus normal, Z-line regular at 40 cm, hemoglobin found in the stomach, type I isolated gastric varices previously treated, gastritis noted, normal-appearing duodenum.  Gastric biopsies revealed mild chronic gastritis. Labs 12/9/2024: WBC 3.6, hemoglobin 10.3, platelet 105, BUN 14, creatinine 0.97, sodium 138, potassium 3.3, total bilirubin 0.9, Clin phosphate 108, AST 25, ALT 20, INR 1.1, hepatitis B core total antibody reactive, hepatitis B surface antigen nonreactive, hepatitis B surface antibody nonreactive, hepatitis B core IgM antibody nonreactive [consistent with prior hepatitis B infection with loss of hepatitis B surface antibody], hepatitis C antibody nonreactive.  Ferritin 19, iron saturation 6%, ceruloplasmin normal, immunoglobulin 1703 [upper limit normal 1640], AFP normal. Labs 10/25/2024: BUN 19, creatinine 0.98, sodium 142, potassium 3.5, albumin 3.6, total bilirubin 0.4, alkaline phosphatase 130, AST 25, ALT 24, WBC 3.2, hemoglobin 9.5, platelet 99, A1c 8.2%. After his EGD 12/23/24 he was feeling well. Approximately 3 days after he developed worsening shortness of breath and went to [facility]. He had a CXR that was unrevealing however a CT chest noted bilateral pleural effusions, moderate in size. He was started on lasix with improvement in his shortness of breath. He had an ECHO that revealed an EF of 60%. He feels well today, his shortness of breath has significantly improved on lasix 40mg daily. He was previously sleeping throughout the day, this has improved. He has been dealing with constipation, currently managing with lactulose that was given to him after his last visit. . Follow up visit 3/11/25 His last MELD-Na was 7. He reports itching over his upper extremities associated with a vesicular rash. He denies confusion but does have occasional lethargy. He is having good bowel movements. He has been using various herbal products including milk thistle, dandelion root, ashwaghanda, and tumeric. He continues to have lower extremity edema. No GI bleeding. . Follow-up visit 4/30/2025 Labs 3/11/2025: Mitochondrial antibody normal, smooth muscle antibody normal, JONATHAN normal, WBC 3.1, hemoglobin 9.6, platelet 89, ferritin 12, iron saturation 9%, hepatitis A IgM negative, hepatitis B surface antigen negative, hepatitis B core total antibody positive, hepatitis B surface antibody nonreactive, hepatitis B surface antigen negative, hepatitis C antibody negative.  INR 1.1.  Alpha-fetoprotein 3.0 (normal), total bilirubin 0.9, 0.5, AST 25, 23, sodium 138, potassium 3.3. Switched from lasix to budesonide, edema has improved.

## 2025-05-14 ENCOUNTER — ERX REFILL RESPONSE (OUTPATIENT)
Dept: URBAN - METROPOLITAN AREA CLINIC 113 | Facility: CLINIC | Age: 55
End: 2025-05-14

## 2025-05-14 RX ORDER — FERROUS SULFATE TAB EC 325 MG (65 MG FE EQUIVALENT) 325 (65 FE) MG
1 TABLET TABLET DELAYED RESPONSE ORAL
Qty: 36 | Refills: 1

## 2025-05-23 ENCOUNTER — OFFICE VISIT (OUTPATIENT)
Dept: URBAN - METROPOLITAN AREA MEDICAL CENTER 19 | Facility: MEDICAL CENTER | Age: 55
End: 2025-05-23

## 2025-05-23 RX ORDER — POTASSIUM CHLORIDE 1500 MG/1
1 TABLET WITH FOOD TABLET, EXTENDED RELEASE ORAL ONCE A DAY
Status: ACTIVE | COMMUNITY

## 2025-05-23 RX ORDER — ARIPIPRAZOLE 5 MG/1
1 TABLET TABLET ORAL ONCE A DAY
Status: ACTIVE | COMMUNITY

## 2025-05-23 RX ORDER — INSULIN GLARGINE 100 [IU]/ML
AS DIRECTED INJECTION, SOLUTION SUBCUTANEOUS
Status: ACTIVE | COMMUNITY

## 2025-05-23 RX ORDER — ATENOLOL 25 MG/1
1 TABLET TABLET ORAL TWICE A DAY
Status: ACTIVE | COMMUNITY

## 2025-05-23 RX ORDER — FERROUS SULFATE TAB EC 325 MG (65 MG FE EQUIVALENT) 325 (65 FE) MG
1 TABLET TABLET DELAYED RESPONSE ORAL
Qty: 36 | Refills: 1 | Status: ACTIVE | COMMUNITY

## 2025-05-23 RX ORDER — ESCITALOPRAM OXALATE 20 MG/1
1 TABLET TABLET ORAL ONCE A DAY
Status: ACTIVE | COMMUNITY

## 2025-05-23 RX ORDER — LACTULOSE 10 G/15ML
15 ML AS NEEDED SOLUTION ORAL THREE TIMES A DAY
Qty: 4050 ML | Refills: 1 | Status: ACTIVE | COMMUNITY
Start: 2024-12-03 | End: 2025-06-01

## 2025-05-23 RX ORDER — BUMETANIDE 1 MG/1
1 TABLET TABLET ORAL ONCE A DAY
Status: ACTIVE | COMMUNITY

## 2025-05-23 RX ORDER — DEXTROAMPHETAMINE SULFATE, DEXTROAMPHETAMINE SACCHARATE, AMPHETAMINE SULFATE AND AMPHETAMINE ASPARTATE 7.5; 7.5; 7.5; 7.5 MG/1; MG/1; MG/1; MG/1
1 CAPSULE IN THE MORNING CAPSULE, EXTENDED RELEASE ORAL ONCE A DAY
Status: ACTIVE | COMMUNITY

## 2025-05-23 RX ORDER — HYDRALAZINE HYDROCHLORIDE 25 MG/1
1 TABLET WITH FOOD TABLET, FILM COATED ORAL THREE TIMES A DAY
Status: ACTIVE | COMMUNITY

## 2025-05-23 RX ORDER — DEXTROAMPHETAMINE SACCHARATE, AMPHETAMINE ASPARTATE, DEXTROAMPHETAMINE SULFATE, AND AMPHETAMINE SULFATE 5; 5; 5; 5 MG/1; MG/1; MG/1; MG/1
1 TABLET TABLET ORAL THREE TIMES A DAY
Refills: 0 | Status: ACTIVE | COMMUNITY

## 2025-05-23 RX ORDER — BICTEGRAVIR SODIUM, EMTRICITABINE, AND TENOFOVIR ALAFENAMIDE FUMARATE 50; 200; 25 MG/1; MG/1; MG/1
1 TABLET TABLET ORAL ONCE A DAY
Status: ACTIVE | COMMUNITY

## 2025-05-23 RX ORDER — VALSARTAN 160 MG/1
1 TABLET TABLET, FILM COATED ORAL ONCE A DAY
Status: ACTIVE | COMMUNITY

## 2025-05-23 RX ORDER — LAMOTRIGINE 150 MG/1
1 TABLET TABLET ORAL ONCE A DAY
Status: ACTIVE | COMMUNITY

## 2025-05-23 RX ORDER — FUROSEMIDE 40 MG/1
1 TABLET TABLET ORAL ONCE A DAY
Qty: 90 TABLET | Refills: 0 | Status: ACTIVE | COMMUNITY
Start: 2025-04-30 | End: 2025-07-29

## 2025-05-23 RX ORDER — METFORMIN HYDROCHLORIDE 500 MG/1
1 TABLET WITH A MEAL TABLET, FILM COATED ORAL ONCE A DAY
Status: ACTIVE | COMMUNITY

## 2025-05-23 RX ORDER — AMLODIPINE BESYLATE 5 MG/1
1 TABLET TABLET ORAL ONCE A DAY
Status: ACTIVE | COMMUNITY

## 2025-05-23 RX ORDER — EPLERENONE 25 MG/1
1 TABLET TABLET ORAL ONCE A DAY
Status: ACTIVE | COMMUNITY

## 2025-05-23 RX ORDER — SULFAMETHOXAZOLE AND TRIMETHOPRIM 800; 160 MG/1; MG/1
1 TABLET TABLET ORAL
Status: ACTIVE | COMMUNITY

## 2025-05-23 RX ORDER — SEMAGLUTIDE 2.68 MG/ML
AS DIRECTED INJECTION, SOLUTION SUBCUTANEOUS
Status: ACTIVE | COMMUNITY

## 2025-05-23 RX ORDER — OMEPRAZOLE 40 MG/1
1 CAPSULE 30 MINUTES BEFORE MORNING AND EVENING MEAL CAPSULE, DELAYED RELEASE ORAL TWICE A DAY
Qty: 180 | Refills: 1 | Status: ACTIVE | COMMUNITY
Start: 2024-12-23

## 2025-06-23 ENCOUNTER — WEB ENCOUNTER (OUTPATIENT)
Dept: URBAN - METROPOLITAN AREA CLINIC 113 | Facility: CLINIC | Age: 55
End: 2025-06-23

## 2025-06-23 RX ORDER — FERROUS SULFATE TAB EC 325 MG (65 MG FE EQUIVALENT) 325 (65 FE) MG
1 TABLET TABLET DELAYED RESPONSE ORAL
Qty: 36 | Refills: 3 | OUTPATIENT

## 2025-07-03 ENCOUNTER — TELEPHONE ENCOUNTER (OUTPATIENT)
Dept: URBAN - METROPOLITAN AREA CLINIC 113 | Facility: CLINIC | Age: 55
End: 2025-07-03

## 2025-07-03 ENCOUNTER — WEB ENCOUNTER (OUTPATIENT)
Dept: URBAN - METROPOLITAN AREA CLINIC 113 | Facility: CLINIC | Age: 55
End: 2025-07-03

## 2025-08-11 ENCOUNTER — TELEPHONE ENCOUNTER (OUTPATIENT)
Dept: URBAN - METROPOLITAN AREA CLINIC 113 | Facility: CLINIC | Age: 55
End: 2025-08-11

## 2025-08-11 ENCOUNTER — ERX REFILL RESPONSE (OUTPATIENT)
Dept: URBAN - METROPOLITAN AREA CLINIC 113 | Facility: CLINIC | Age: 55
End: 2025-08-11

## 2025-08-11 RX ORDER — LACTULOSE 10 G/15ML
TAKE 15 ML BY MOUTH AS NEEDED THREE TIMES DAILY SOLUTION ORAL
Qty: 4050 MILLILITER | Refills: 0 | OUTPATIENT

## 2025-08-26 ENCOUNTER — LAB OUTSIDE AN ENCOUNTER (OUTPATIENT)
Dept: URBAN - METROPOLITAN AREA CLINIC 113 | Facility: CLINIC | Age: 55
End: 2025-08-26

## 2025-08-27 LAB
A/G RATIO: 1
ABSOLUTE BASOPHILS: 29
ABSOLUTE EOSINOPHILS: 49
ABSOLUTE LYMPHOCYTES: 916
ABSOLUTE MONOCYTES: 232
ABSOLUTE NEUTROPHILS: 1673
AFP, SERUM, TUMOR MARKER: 2.4
ALBUMIN: 3.5
ALKALINE PHOSPHATASE: 96
ALT (SGPT): 25
AST (SGOT): 33
BASOPHILS: 1
BILIRUBIN, TOTAL: 1.1
BUN/CREATININE RATIO: (no result)
BUN: 17
CALCIUM: 9.2
CARBON DIOXIDE, TOTAL: 22
CHLORIDE: 109
CREATININE: 0.98
EGFR: 91
EOSINOPHILS: 1.7
GLOBULIN, TOTAL: 3.4
GLUCOSE: 176
HEMATOCRIT: 36.1
HEMOGLOBIN: 11.3
INR: 1.1
LYMPHOCYTES: 31.6
MCH: 26.3
MCHC: 31.3
MCV: 84
MONOCYTES: 8
MPV: 10.6
NEUTROPHILS: 57.7
PLATELET COUNT: 119
POTASSIUM: 3.5
PROTEIN, TOTAL: 6.9
PT: 11.3
RDW: 15.7
RED BLOOD CELL COUNT: 4.3
SODIUM: 139
WHITE BLOOD CELL COUNT: 2.9

## 2025-08-28 ENCOUNTER — TELEPHONE ENCOUNTER (OUTPATIENT)
Dept: URBAN - METROPOLITAN AREA CLINIC 113 | Facility: CLINIC | Age: 55
End: 2025-08-28

## 2025-08-28 ENCOUNTER — OFFICE VISIT (OUTPATIENT)
Dept: URBAN - METROPOLITAN AREA CLINIC 113 | Facility: CLINIC | Age: 55
End: 2025-08-28
Payer: MEDICARE

## 2025-08-28 DIAGNOSIS — D61.818 PANCYTOPENIA: ICD-10-CM

## 2025-08-28 DIAGNOSIS — D50.0 IRON DEFICIENCY ANEMIA DUE TO CHRONIC BLOOD LOSS: ICD-10-CM

## 2025-08-28 DIAGNOSIS — R60.0 LOWER EXTREMITY EDEMA: ICD-10-CM

## 2025-08-28 DIAGNOSIS — K29.50 CHRONIC GASTRITIS WITHOUT BLEEDING, UNSPECIFIED GASTRITIS TYPE: ICD-10-CM

## 2025-08-28 DIAGNOSIS — K76.6 PORTAL HYPERTENSION: ICD-10-CM

## 2025-08-28 DIAGNOSIS — Z95.828 S/P TIPS (TRANSJUGULAR INTRAHEPATIC PORTOSYSTEMIC SHUNT): ICD-10-CM

## 2025-08-28 DIAGNOSIS — K74.69 OTHER CIRRHOSIS OF LIVER: ICD-10-CM

## 2025-08-28 PROCEDURE — 99213 OFFICE O/P EST LOW 20 MIN: CPT | Performed by: STUDENT IN AN ORGANIZED HEALTH CARE EDUCATION/TRAINING PROGRAM

## 2025-08-28 RX ORDER — OMEPRAZOLE 40 MG/1
1 CAPSULE 30 MINUTES BEFORE MORNING AND EVENING MEAL CAPSULE, DELAYED RELEASE ORAL TWICE A DAY
Qty: 180 | Refills: 1 | Status: ACTIVE | COMMUNITY
Start: 2024-12-23

## 2025-08-28 RX ORDER — DEXTROAMPHETAMINE SULFATE, DEXTROAMPHETAMINE SACCHARATE, AMPHETAMINE SULFATE AND AMPHETAMINE ASPARTATE 7.5; 7.5; 7.5; 7.5 MG/1; MG/1; MG/1; MG/1
1 CAPSULE IN THE MORNING CAPSULE, EXTENDED RELEASE ORAL ONCE A DAY
Status: ACTIVE | COMMUNITY

## 2025-08-28 RX ORDER — BUMETANIDE 1 MG/1
1 TABLET TABLET ORAL ONCE A DAY
Status: ACTIVE | COMMUNITY

## 2025-08-28 RX ORDER — DOXYCYCLINE HYCLATE 100 MG/1
1 TABLET TABLET, COATED ORAL ONCE A DAY
Status: ACTIVE | COMMUNITY
Start: 2025-08-19 | End: 2025-08-29

## 2025-08-28 RX ORDER — SEMAGLUTIDE 2.68 MG/ML
AS DIRECTED INJECTION, SOLUTION SUBCUTANEOUS
Status: ON HOLD | COMMUNITY

## 2025-08-28 RX ORDER — VALSARTAN 160 MG/1
1 TABLET TABLET, FILM COATED ORAL ONCE A DAY
Status: ACTIVE | COMMUNITY

## 2025-08-28 RX ORDER — INSULIN GLARGINE 300 U/ML
AS DIRECTED INJECTION, SOLUTION SUBCUTANEOUS
Status: ACTIVE | COMMUNITY

## 2025-08-28 RX ORDER — ATENOLOL 25 MG/1
1 TABLET TABLET ORAL TWICE A DAY
Status: ACTIVE | COMMUNITY

## 2025-08-28 RX ORDER — ESCITALOPRAM OXALATE 20 MG/1
1 TABLET TABLET ORAL ONCE A DAY
Status: ACTIVE | COMMUNITY

## 2025-08-28 RX ORDER — ARIPIPRAZOLE 5 MG/1
1 TABLET TABLET ORAL ONCE A DAY
Status: ACTIVE | COMMUNITY

## 2025-08-28 RX ORDER — TIRZEPATIDE 12.5 MG/.5ML
AS DIRECTED INJECTION, SOLUTION SUBCUTANEOUS
Status: ACTIVE | COMMUNITY

## 2025-08-28 RX ORDER — SULFAMETHOXAZOLE AND TRIMETHOPRIM 800; 160 MG/1; MG/1
1 TABLET TABLET ORAL
Status: ACTIVE | COMMUNITY

## 2025-08-28 RX ORDER — DEXTROAMPHETAMINE SACCHARATE, AMPHETAMINE ASPARTATE, DEXTROAMPHETAMINE SULFATE, AND AMPHETAMINE SULFATE 5; 5; 5; 5 MG/1; MG/1; MG/1; MG/1
1 TABLET TABLET ORAL THREE TIMES A DAY
Refills: 0 | Status: ON HOLD | COMMUNITY

## 2025-08-28 RX ORDER — LAMOTRIGINE 150 MG/1
1 TABLET TABLET ORAL ONCE A DAY
Status: ACTIVE | COMMUNITY

## 2025-08-28 RX ORDER — METFORMIN HYDROCHLORIDE 500 MG/1
1 TABLET WITH A MEAL TABLET, FILM COATED ORAL ONCE A DAY
Status: ACTIVE | COMMUNITY

## 2025-08-28 RX ORDER — FERROUS SULFATE TAB EC 325 MG (65 MG FE EQUIVALENT) 325 (65 FE) MG
1 TABLET TABLET DELAYED RESPONSE ORAL
Qty: 36 | Refills: 3 | Status: ACTIVE | COMMUNITY

## 2025-08-28 RX ORDER — POTASSIUM CHLORIDE 1500 MG/1
1 TABLET WITH FOOD TABLET, EXTENDED RELEASE ORAL ONCE A DAY
Status: ACTIVE | COMMUNITY

## 2025-08-28 RX ORDER — BICTEGRAVIR SODIUM, EMTRICITABINE, AND TENOFOVIR ALAFENAMIDE FUMARATE 50; 200; 25 MG/1; MG/1; MG/1
1 TABLET TABLET ORAL ONCE A DAY
Status: ACTIVE | COMMUNITY

## 2025-08-28 RX ORDER — HYDRALAZINE HYDROCHLORIDE 25 MG/1
1 TABLET WITH FOOD TABLET, FILM COATED ORAL THREE TIMES A DAY
Status: ACTIVE | COMMUNITY

## 2025-08-28 RX ORDER — AMLODIPINE BESYLATE 5 MG/1
1 TABLET TABLET ORAL ONCE A DAY
Status: ACTIVE | COMMUNITY

## 2025-08-28 RX ORDER — LACTULOSE 10 G/15ML
TAKE 15 ML BY MOUTH AS NEEDED THREE TIMES DAILY SOLUTION ORAL
Qty: 4050 MILLILITER | Refills: 0 | Status: ACTIVE | COMMUNITY

## 2025-08-28 RX ORDER — EPLERENONE 25 MG/1
1 TABLET TABLET ORAL ONCE A DAY
Status: ACTIVE | COMMUNITY

## 2025-08-28 RX ORDER — INSULIN GLARGINE 100 [IU]/ML
AS DIRECTED INJECTION, SOLUTION SUBCUTANEOUS
Status: ON HOLD | COMMUNITY